# Patient Record
Sex: FEMALE | Race: WHITE | NOT HISPANIC OR LATINO | URBAN - METROPOLITAN AREA
[De-identification: names, ages, dates, MRNs, and addresses within clinical notes are randomized per-mention and may not be internally consistent; named-entity substitution may affect disease eponyms.]

---

## 2023-11-13 ENCOUNTER — OFFICE (OUTPATIENT)
Dept: URBAN - METROPOLITAN AREA CLINIC 72 | Facility: CLINIC | Age: 43
End: 2023-11-13

## 2023-11-13 VITALS
HEIGHT: 65 IN | SYSTOLIC BLOOD PRESSURE: 96 MMHG | HEART RATE: 86 BPM | DIASTOLIC BLOOD PRESSURE: 62 MMHG | OXYGEN SATURATION: 96 % | WEIGHT: 151 LBS

## 2023-11-13 DIAGNOSIS — K59.09 OTHER CONSTIPATION: ICD-10-CM

## 2023-11-13 DIAGNOSIS — I27.20 PULMONARY HYPERTENSION, UNSPECIFIED: ICD-10-CM

## 2023-11-13 DIAGNOSIS — K64.8 OTHER HEMORRHOIDS: ICD-10-CM

## 2023-11-13 PROCEDURE — 99203 OFFICE O/P NEW LOW 30 MIN: CPT | Performed by: INTERNAL MEDICINE

## 2023-11-13 RX ORDER — PLECANATIDE 3 MG/1
TABLET ORAL
Qty: 30 | Refills: 6 | Status: ACTIVE
Start: 2023-11-13

## 2023-11-13 NOTE — SERVICENOTES
Our goal is to partner with you to improve your health and well being. It is important for you to complete necessary testing and follow the instructions given to you at your clinic visit. Our office will call you within 2 weeks with results of any testing but you may also call sooner to obtain results - [phone].   If you have any questions or concerns please feel free to call us.  We take your care very seriously and we thank you for your trust!
- stop the miralax and amitizia
-  trial of trulance 1 pill once a day with or without food if this is doing well for you please let me know and I can put in a prescription
- start magnesium citrate supplements. start with 200 mg and increase to a max of 500 mg if needed
- start natures bounty 10 probiotics
- As you are improving and we have a regimen that works hopefully we can wean off some of these medications
- let me know if this regimen is not working for you. 
- you will be due for screening colonoscopy at age 44
- if the hemorrhoids flare then try sitz baths, a bit of hydrocortison cream to anal area for 3 -7 days. 
- , Follow up as needed if symptoms are not improving or you have new or concerning symptoms.

## 2023-11-13 NOTE — SERVICEHPINOTES
Oanh Lucero   is seen for an initial visit today.  
anni huttno   reviewed PCP note and she is followed there for weight loss clinic (on ozempic).  She also has IBS-C and was prescribed linzess
anni hutton  She has a lot of history of constipation. It started a year ago.  She weighted 220 lbs at that time, she went to [place] weight loss clinic.  She put he on tybelsus she lost 32 lbs in 3 months.  The constipation started wtih that.  She was taking miralax and stool softeners.  She stopped the rybelsus and stayed off it for 3 months and still had the constipation.  She changed to ozempic.  she still has constipation and doesnt feel like it has gotten worse.  
anni hutton She started linzess, started on 145 and that gave her a lot of diarrhea. She tried it for 2 weeks.  SHe lowered it to the 72 and that didn't seem to help as much.  She felt like she had to go to the bathroom but nothing was coming out.  She felt like she wasn't' emptying out.  She tried for a couple of months and it wasnt doing great.  She switched to the amitizia and miralax but she still feels like she doesn't have a full BM every day.  Her hemorrhoids are flared up bad.  It is irritated and painful but not really bleeding. anni hutton She has had pulmonary hyertension for 8 years and sees Dr. Norris. She uses O2 concentrator at night as needed.  Most of the itme she doesn't use it. My nurse has reviewed and updated the medication list with the patient. I have reviewed the medication list along with the documented medical, social and family history. Pertinent details are also noted above in the HPI.

## 2023-12-27 ENCOUNTER — OFFICE (OUTPATIENT)
Dept: URBAN - METROPOLITAN AREA CLINIC 72 | Facility: CLINIC | Age: 43
End: 2023-12-27

## 2023-12-27 VITALS
SYSTOLIC BLOOD PRESSURE: 110 MMHG | HEART RATE: 88 BPM | DIASTOLIC BLOOD PRESSURE: 70 MMHG | WEIGHT: 163 LBS | HEIGHT: 65 IN

## 2023-12-27 DIAGNOSIS — I27.20 PULMONARY HYPERTENSION, UNSPECIFIED: ICD-10-CM

## 2023-12-27 DIAGNOSIS — K59.09 OTHER CONSTIPATION: ICD-10-CM

## 2023-12-27 DIAGNOSIS — K60.2 ANAL FISSURE, UNSPECIFIED: ICD-10-CM

## 2023-12-27 DIAGNOSIS — K64.8 OTHER HEMORRHOIDS: ICD-10-CM

## 2023-12-27 PROCEDURE — 99214 OFFICE O/P EST MOD 30 MIN: CPT | Performed by: INTERNAL MEDICINE

## 2023-12-27 RX ORDER — NIFEDIPINE
POWDER (GRAM) MISCELLANEOUS
Qty: 1 | Refills: 2 | Status: ACTIVE
Start: 2023-12-27

## 2023-12-27 NOTE — SERVICENOTES
Our goal is to partner with you to improve your health and well being. It is important for you to complete necessary testing and follow the instructions given to you at your clinic visit. Our office will call you within 2 weeks with results of any testing but you may also call sooner to obtain results [phone].   If you have any questions or concerns please feel free to call.  We take your care very seriously and we thank you for your trust!
- Start psyllium fiber (brand examples are Metamucil and konsyl).  Do not get the one with artificial sweeteners (ie don't get Metamucil Sugar Free Orange).  Start with 1/4 dose and increase to full dose as tolerated.  If this causes too much gas, then please let me know.  
- stop the probiotics for now.  As the gas subsides then restart them and if they seem to be causing the gas then stop 
- As you slowly increase the psyllium dose, drop the dose of miralax to once a day and then 1/2 dose a day and drop the magnesium down to 300 mg and then 100 mg and see if we can find the balance where your constipation is controlled and gas is controlled
- nifedipine ointment to anal area twice a day for 14 days
- hydrocortisone ointment to anal area 1-2 times a day 
- if they cant compound nifedpine at [facility] then we will have to find a local compounding pharmacy
- follow up in 2 months, telehealth

## 2023-12-27 NOTE — SERVICEHPINOTES
Oanh Lucero   is seen today for a follow-up visit.  
br
breviewed PCP note and she is followed there for weight loss clinic (on ozempic). She also has IBS-C and was prescribed linzessShe has a lot of history of constipation. It started a year ago. She weighted 220 lbs at that time, she went to [place] weight loss clinic. She put he on tybelsus she lost 32 lbs in 3 months. The constipation started wtih that. She was taking miralax and stool softeners. She stopped the rybelsus and stayed off it for 3 months and still had the constipation. She changed to ozempic. she still has constipation and doesnt feel like it has gotten worse. She started linzess, started on 145 and that gave her a lot of diarrhea. She tried it for 2 weeks. SHe lowered it to the 72 and that didn't seem to help as much. She felt like she had to go to the bathroom but nothing was coming out. She felt like she wasn't' emptying out. She tried for a couple of months and it wasnt doing great. She switched to the amitizia and miralax but she still feels like she doesn't have a full BM every day. Her hemorrhoids are flared up bad. It is irritated and painful but not really bleeding. Karon has had pulmonary hyertension for 8 years and sees Dr. Norris. She uses O2 concentrator at night as needed. Most of the itme she doesn't use it. br    br  Plan from last visit:
br
br- stop the miralax and amitiziabr- trial of trulance 1 pill once a day with or without food if this is doing well for you please let me know and I can put in a prescriptionbr- start magnesium citrate supplements. start with 200 mg and increase to a max of 500 mg if neededbr- start natures bounty 10 probioticsbr- As you are improving and we have a regimen that works hopefully we can wean off some of these medicationsbr- let me know if this regimen is not working for you. br- you will be due for screening colonoscopy at age 44br- if the hemorrhoids flare then try sitz baths, a bit of hydrocortison cream to anal area for 3 -7 days. br- , Follow up as needed if symptoms are not improving or you have new or concerning symptoms. Interval History:  12/27/2023   
br
br   trulance gave her diarrhea, 
br dr. costa recommended miralax
br stopped ozempic since 10/30.  
br She is having a BM every day and she feels like it is hard to come out soft but fomed.  THere is some straining. 
br She is on miralax 1-2 times a day. She is taking magnesium citrate 500 mg a day as well.  
br Her hemorrhoids are flared an irritated. in the past couple weeks hemorrhoids feels like razor blades. She is having very bad bloating and very bad gas.  My nurse has reviewed and updated the medication list with the patient (medication reconciliation). I have also reviewed the medication list. New updates were made to the patient's medical, social and family history. Pertinent details are also noted above in the HPI.br visited="true"

## 2024-02-28 ENCOUNTER — TELEHEALTH PROVIDED OTHER THAN IN PATIENT'S HOME (OUTPATIENT)
Dept: URBAN - METROPOLITAN AREA CLINIC 72 | Facility: CLINIC | Age: 44
End: 2024-02-28

## 2024-02-28 VITALS — HEIGHT: 65 IN | WEIGHT: 166 LBS

## 2024-02-28 DIAGNOSIS — K60.2 ANAL FISSURE, UNSPECIFIED: ICD-10-CM

## 2024-02-28 DIAGNOSIS — K59.09 OTHER CONSTIPATION: ICD-10-CM

## 2024-02-28 DIAGNOSIS — I27.20 PULMONARY HYPERTENSION, UNSPECIFIED: ICD-10-CM

## 2024-02-28 DIAGNOSIS — K64.8 OTHER HEMORRHOIDS: ICD-10-CM

## 2024-02-28 PROCEDURE — 99212 OFFICE O/P EST SF 10 MIN: CPT | Mod: 95 | Performed by: INTERNAL MEDICINE

## 2024-02-28 NOTE — SERVICENOTES
Telehealth Platform Used: [employer]
Location of patient: home
Location of provider: [facility]
Other persons participating: none

## 2024-02-28 NOTE — SERVICEHPINOTES
is seen today for a follow-up visit. 
br
ch[place] PCP note and she is followed there for weight loss clinic (on ozempic). She also has IBS-C and was prescribed linzessShe has a lot of history of constipation. It started a year ago. She weighted 220 lbs at that time, she went to [place] weight loss clinic. She put he on tybelsus she lost 32 lbs in 3 months. The constipation started wtih that. She was taking miralax and stool softeners. She stopped the rybelsus and stayed off it for 3 months and still had the constipation. She changed to ozempic. she still has constipation and doesnt feel like it has gotten worse.She started linzess, started on 145 and that gave her a lot of diarrhea. She tried it for 2 weeks. SHe lowered it to the 72 and that didn't seem to help as much. She felt like she had to go to the bathroom but nothing was coming out. She felt like she wasn't' emptying out. She tried for a couple of months and it wasnt doing great. She switched to the amitizia and miralax but she still feels like she doesn't have a full BM every day. Her hemorrhoids are flared up bad. It is irritated and painful but not really bleeding.Karon has had pulmonary hyertension for 8 years and sees Dr. Norris. She uses O2 concentrator at night as needed. Most of the itme she doesn't use it.Interval History:12/27/2023trulance gave her diarrhea,brdr. costa recommended miralaxbrstopped ozempic since 10/30. Karon is having a BM every day and she feels like it is hard to come out soft but fomed. THere is some straining. Karon is on miralax 1-2 times a day. She is taking magnesium citrate 500 mg a day as well. brHer hemorrhoids are flared an irritated. in the past couple weeks hemorrhoids feels like razor blades. She is having very bad bloating and very bad gas. 
br
br  plan from last visit 
br
br- Start psyllium fiber (brand examples are Metamucil and konsyl). Do not get the one with artificial sweeteners (ie don't get Metamucil Sugar Free Orange). Start with 1/4 dose and increase to full dose as tolerated. If this causes too much gas, then please let me know. br- stop the probiotics for now. As the gas subsides then restart them and if they seem to be causing the gas then stop br- As you slowly increase the psyllium dose, drop the dose of miralax to once a day and then 1/2 dose a day and drop the magnesium down to 300 mg and then 100 mg and see if we can find the balance where your constipation is controlled and gas is controlledbr- nifedipine ointment to anal area twice a day for 14 daysbr- hydrocortisone ointment to anal area 1-2 times a day br- if they cant compound nifedpine at [facility] then we will have to find a local compounding pharmacybr- follow up in 2 months, telehealth
br 
br
Interval history: 
br
She stopped magnesium and probiotics and that helped the gas and bloating.
br She felt like the nifedipine ointment helped but it flared up again and she saw a GI doc who bands hemorrhoids and he told her that she had a posterior fissures. She feels like it is working but not fully.  It is still causing her pain and discomfort and she doesnt think it is healing that well.  br 
She started senna and that is working OK to make the BM stoft.  she is taking it 3-4  senna.  Last dose of ozempic was 10/23. She is taking miraalx once a day.  
br She is seeing a GI at [place] for a colonoscopy as well.  Given the pulmonary hypertension she needs to have it there.

## 2024-03-06 ENCOUNTER — APPOINTMENT (OUTPATIENT)
Dept: URBAN - METROPOLITAN AREA CLINIC 306 | Age: 44
Setting detail: DERMATOLOGY
End: 2024-03-06

## 2024-03-06 DIAGNOSIS — L30.0 NUMMULAR DERMATITIS: ICD-10-CM

## 2024-03-06 DIAGNOSIS — D49.2 NEOPLASM OF UNSPECIFIED BEHAVIOR OF BONE, SOFT TISSUE, AND SKIN: ICD-10-CM

## 2024-03-06 DIAGNOSIS — B07.8 OTHER VIRAL WARTS: ICD-10-CM

## 2024-03-06 PROCEDURE — OTHER LIQUID NITROGEN: OTHER

## 2024-03-06 PROCEDURE — OTHER BIOPSY BY SHAVE METHOD: OTHER

## 2024-03-06 PROCEDURE — OTHER COUNSELING: OTHER

## 2024-03-06 PROCEDURE — OTHER PRESCRIPTION: OTHER

## 2024-03-06 PROCEDURE — OTHER PRESCRIPTION MEDICATION MANAGEMENT: OTHER

## 2024-03-06 PROCEDURE — 99203 OFFICE O/P NEW LOW 30 MIN: CPT | Mod: 25

## 2024-03-06 PROCEDURE — 11102 TANGNTL BX SKIN SINGLE LES: CPT | Mod: 59

## 2024-03-06 PROCEDURE — 17110 DESTRUCT B9 LESION 1-14: CPT

## 2024-03-06 RX ORDER — CLOBETASOL PROPIONATE 0.5 MG/G
OINTMENT TOPICAL
Qty: 60 | Refills: 6 | Status: ERX | COMMUNITY
Start: 2024-03-06

## 2024-03-06 ASSESSMENT — LOCATION SIMPLE DESCRIPTION DERM
LOCATION SIMPLE: RIGHT INDEX FINGER
LOCATION SIMPLE: RIGHT UPPER BACK

## 2024-03-06 ASSESSMENT — LOCATION ZONE DERM
LOCATION ZONE: TRUNK
LOCATION ZONE: FINGER

## 2024-03-06 ASSESSMENT — LOCATION DETAILED DESCRIPTION DERM
LOCATION DETAILED: RIGHT PROXIMAL PALMAR INDEX FINGER
LOCATION DETAILED: RIGHT SUPERIOR MEDIAL UPPER BACK
LOCATION DETAILED: RIGHT DISTAL PALMAR INDEX FINGER

## 2024-03-06 NOTE — PROCEDURE: LIQUID NITROGEN
Include Z78.9 (Other Specified Conditions Influencing Health Status) As An Associated Diagnosis?: No
Detail Level: Detailed
Spray Paint Text: The liquid nitrogen was applied to the skin utilizing a spray paint frosting technique.
Show Spray Paint Technique Variable?: Yes
Post-Care Instructions: I reviewed with the patient in detail post-care instructions. Patient is to wear sunprotection, and avoid picking at any of the treated lesions. Pt may apply Vaseline to crusted or scabbing areas.
Consent: The patient's consent was obtained including but not limited to risks of crusting, scabbing, blistering, scarring, darker or lighter pigmentary change, recurrence, incomplete removal and infection.
Medical Necessity Clause: This procedure was medically necessary because the lesions that were treated were:
Medical Necessity Information: It is in your best interest to select a reason for this procedure from the list below. All of these items fulfill various CMS LCD requirements except the new and changing color options.

## 2024-03-06 NOTE — PROCEDURE: PRESCRIPTION MEDICATION MANAGEMENT
Detail Level: Zone
Initiate Treatment: Clobetasol 0.05% ointment
Render In Strict Bullet Format?: No
Samples Given: Impoyz

## 2024-03-06 NOTE — HPI: SKIN LESIONS
How Severe Is Your Skin Lesion?: mild
Have Your Skin Lesions Been Treated?: not been treated
Is This A New Presentation, Or A Follow-Up?: Skin Lesions
Additional History: Patient would like her shoulders and arms checked for abnormal moles

## 2024-03-06 NOTE — PROCEDURE: BIOPSY BY SHAVE METHOD
Detail Level: Detailed
Depth Of Biopsy: dermis
Was A Bandage Applied: Yes
Size Of Lesion In Cm: 0
Biopsy Type: H and E
Biopsy Method: Dermablade
Anesthesia Type: 1% lidocaine with epinephrine
Anesthesia Volume In Cc: 0.5
Hemostasis: Drysol
Wound Care: Petrolatum
Dressing: bandage
Destruction After The Procedure: No
Type Of Destruction Used: Curettage
Curettage Text: The wound bed was treated with curettage after the biopsy was performed.
Cryotherapy Text: The wound bed was treated with cryotherapy after the biopsy was performed.
Electrodesiccation Text: The wound bed was treated with electrodesiccation after the biopsy was performed.
Electrodesiccation And Curettage Text: The wound bed was treated with electrodesiccation and curettage after the biopsy was performed.
Silver Nitrate Text: The wound bed was treated with silver nitrate after the biopsy was performed.
Lab: [id number]
Lab Facility: [id number]
Consent: Written consent was obtained and risks were reviewed including but not limited to scarring, infection, bleeding, scabbing, incomplete removal, nerve damage and allergy to anesthesia.
Post-Care Instructions: I reviewed with the patient in detail post-care instructions. Patient is to keep the biopsy site dry overnight, and then apply bacitracin twice daily until healed. Patient may apply hydrogen peroxide soaks to remove any crusting.
Notification Instructions: Patient will be notified of biopsy results. However, patient instructed to call the office if not contacted within 2 weeks.
Billing Type: Third-Party Bill
Information: Selecting Yes will display possible errors in your note based on the variables you have selected. This validation is only offered as a suggestion for you. PLEASE NOTE THAT THE VALIDATION TEXT WILL BE REMOVED WHEN YOU FINALIZE YOUR NOTE. IF YOU WANT TO FAX A PRELIMINARY NOTE YOU WILL NEED TO TOGGLE THIS TO 'NO' IF YOU DO NOT WANT IT IN YOUR FAXED NOTE.

## 2024-04-03 ENCOUNTER — APPOINTMENT (OUTPATIENT)
Dept: URBAN - METROPOLITAN AREA CLINIC 306 | Age: 44
Setting detail: DERMATOLOGY
End: 2024-04-03

## 2024-04-03 DIAGNOSIS — B02.9 ZOSTER WITHOUT COMPLICATIONS: ICD-10-CM

## 2024-04-03 PROCEDURE — OTHER COUNSELING: OTHER

## 2024-04-03 PROCEDURE — OTHER ADDITIONAL NOTES: OTHER

## 2024-04-03 PROCEDURE — 99213 OFFICE O/P EST LOW 20 MIN: CPT

## 2024-04-03 PROCEDURE — OTHER PRESCRIPTION: OTHER

## 2024-04-03 RX ORDER — ACYCLOVIR 50 MG/G
CREAM TOPICAL
Qty: 5 | Refills: 5 | Status: ERX | COMMUNITY
Start: 2024-04-03

## 2024-04-03 ASSESSMENT — LOCATION DETAILED DESCRIPTION DERM: LOCATION DETAILED: L4 RIGHT POSTERIOR DERMATOME

## 2024-04-03 ASSESSMENT — LOCATION ZONE DERM: LOCATION ZONE: TRUNK

## 2024-04-03 ASSESSMENT — LOCATION SIMPLE DESCRIPTION DERM: LOCATION SIMPLE: L4 RIGHT POSTERIOR DERMATOME

## 2024-04-03 NOTE — PROCEDURE: ADDITIONAL NOTES
Render Risk Assessment In Note?: no
Detail Level: Simple
Additional Notes: Gave pt sernivo samples
Additional Notes: Painful blisters on erythematous base right flank beginning to crust that have been present for almost 1 week

## 2024-05-15 ENCOUNTER — RX ONLY (RX ONLY)
Age: 44
End: 2024-05-15

## 2024-05-15 ENCOUNTER — APPOINTMENT (OUTPATIENT)
Dept: URBAN - METROPOLITAN AREA CLINIC 306 | Age: 44
Setting detail: DERMATOLOGY
End: 2024-05-31

## 2024-05-15 DIAGNOSIS — D485 NEOPLASM OF UNCERTAIN BEHAVIOR OF SKIN: ICD-10-CM

## 2024-05-15 PROBLEM — D48.5 NEOPLASM OF UNCERTAIN BEHAVIOR OF SKIN: Status: ACTIVE | Noted: 2024-05-15

## 2024-05-15 PROCEDURE — OTHER EXCISION: OTHER

## 2024-05-15 PROCEDURE — 11402 EXC TR-EXT B9+MARG 1.1-2 CM: CPT

## 2024-05-15 PROCEDURE — 12032 INTMD RPR S/A/T/EXT 2.6-7.5: CPT

## 2024-05-15 RX ORDER — CEPHALEXIN 500 MG/1
CAPSULE ORAL
Qty: 10 | Refills: 0 | Status: ERX | COMMUNITY
Start: 2024-05-15

## 2024-05-15 ASSESSMENT — LOCATION SIMPLE DESCRIPTION DERM: LOCATION SIMPLE: RIGHT UPPER BACK

## 2024-05-15 ASSESSMENT — LOCATION DETAILED DESCRIPTION DERM: LOCATION DETAILED: RIGHT SUPERIOR MEDIAL UPPER BACK

## 2024-05-15 ASSESSMENT — LOCATION ZONE DERM: LOCATION ZONE: TRUNK

## 2024-05-15 NOTE — PROCEDURE: EXCISION

## 2024-05-24 ENCOUNTER — APPOINTMENT (OUTPATIENT)
Dept: URBAN - METROPOLITAN AREA CLINIC 306 | Age: 44
Setting detail: DERMATOLOGY
End: 2024-05-29

## 2024-05-24 DIAGNOSIS — Z48.02 ENCOUNTER FOR REMOVAL OF SUTURES: ICD-10-CM

## 2024-05-24 PROCEDURE — OTHER SUTURE REMOVAL (GLOBAL PERIOD): OTHER

## 2024-05-24 ASSESSMENT — LOCATION ZONE DERM: LOCATION ZONE: TRUNK

## 2024-05-24 ASSESSMENT — LOCATION SIMPLE DESCRIPTION DERM: LOCATION SIMPLE: RIGHT UPPER BACK

## 2024-05-24 ASSESSMENT — LOCATION DETAILED DESCRIPTION DERM: LOCATION DETAILED: RIGHT SUPERIOR UPPER BACK

## 2024-05-24 NOTE — PROCEDURE: SUTURE REMOVAL (GLOBAL PERIOD)
Detail Level: Detailed
Add 17862 Cpt? (Important Note: In 2017 The Use Of 22315 Is Being Tracked By Cms To Determine Future Global Period Reimbursement For Global Periods): no

## 2024-08-14 ENCOUNTER — TELEHEALTH PROVIDED OTHER THAN IN PATIENT'S HOME (OUTPATIENT)
Dept: URBAN - METROPOLITAN AREA CLINIC 72 | Facility: CLINIC | Age: 44
End: 2024-08-14
Payer: COMMERCIAL

## 2024-08-14 VITALS — HEIGHT: 65 IN | WEIGHT: 171 LBS

## 2024-08-14 DIAGNOSIS — K59.09 OTHER CONSTIPATION: ICD-10-CM

## 2024-08-14 DIAGNOSIS — K60.2 ANAL FISSURE, UNSPECIFIED: ICD-10-CM

## 2024-08-14 DIAGNOSIS — I27.20 PULMONARY HYPERTENSION, UNSPECIFIED: ICD-10-CM

## 2024-08-14 DIAGNOSIS — R15.9 FULL INCONTINENCE OF FECES: ICD-10-CM

## 2024-08-14 PROCEDURE — 99214 OFFICE O/P EST MOD 30 MIN: CPT | Mod: 95 | Performed by: INTERNAL MEDICINE

## 2024-08-14 NOTE — SERVICEHPINOTES
is seen today for a follow-up visit. 
anni barney is followed there for weight loss clinic (on ozempic). She also has IBS-C and was prescribed linzessStam has a lot of history of constipation. It started a year ago. She weighted 220 lbs at that time, she went to [facility] weight loss clinic. She put he on tybelsus she lost 32 lbs in 3 months. The constipation started wtih that. She was taking miralax and stool softeners. She stopped the rybelsus and stayed off it for 3 months and still had the constipation. She changed to ozempic. she still has constipation and doesnt feel like it has gotten worse.She started linzess, started on 145 and that gave her a lot of diarrhea. She tried it for 2 weeks. SHe lowered it to the 72 and that didn't seem to help as much. She felt like she had to go to the bathroom but nothing was coming out. She felt like she wasn't' emptying out. She tried for a couple of months and it wasnt doing great. She switched to the amitizia and miralax but she still feels like she doesn't have a full BM every day. Her hemorrhoids are flared up bad. It is irritated and painful but not really bleeding.Karon has had pulmonary hyertension for 8 years and sees Dr. Norris. She uses O2 concentrator at night as needed. Most of the itme she doesn't use it.Interval History:12/27/2023trulance gave her diarrhea,brdr. costa recommended miralaxbrstopped ozempic since 10/30.Karon is having a BM every day and she feels like it is hard to come out soft but fomed. THere is some straining.Karon is on miralax 1-2 times a day. She is taking magnesium citrate 500 mg a day as well.Olga hemorrhoids are flared an irritated. in the past couple weeks hemorrhoids feels like razor blades. She is having very bad bloating and very bad gas.Interval history 2/24brShe stopped magnesium and probiotics and that helped the gas and bloating.Karon felt like the nifedipine ointment helped but it flared up again and she saw a GI doc who bands hemorrhoids and he told her that she had a posterior fissures. She feels like it is working but not fully. It is still causing her pain and discomfort and she doesnt think it is healing that well. Karon started senna and that is working OK to make the BM stoft. she is taking it 3-4 senna. Last dose of ozempic was 10/23. She is taking miraalx once a day. Karon is seeing a GI at [facility] for a colonoscopy as well. Given the pulmonary hypertension she needs to have it there.
br
br 
Plan from last visit: 
br referral to Dr. melina hutton
br Interval history: 
br She saw Dr. summers and he did not see a fissure at the time of that exam. Felt pain may be due to outlet obstruction.  
Karon started pelvic floor PT and that helped. She completed a course of pelvic floor PT.  The pain has improved.  Recently she has had fecal incontinence when she passes gas.  SHe doesnt it is going to come out.  She has been taking miralax once a day.  She has a BM every day and it is soft and mushy or formed.  br br
br My nurse has reviewed and updated the medication list with the patient (medication reconciliation). I have also reviewed the medication list. New updates were made to the patient's medical, social and family history. Pertinent details are also noted above in the HPI.

## 2024-08-14 NOTE — SERVICENOTES
Telehealth Platform Used: [employer]
Location of patient: home 
[facility] 
Other persons participating: no
- off artificial sweeteners.
- trial of metamcucil
- may have to consider colonoscopy if not improved.